# Patient Record
Sex: FEMALE | Race: WHITE | NOT HISPANIC OR LATINO | Employment: FULL TIME | URBAN - METROPOLITAN AREA
[De-identification: names, ages, dates, MRNs, and addresses within clinical notes are randomized per-mention and may not be internally consistent; named-entity substitution may affect disease eponyms.]

---

## 2017-03-30 RX ORDER — ACETAMINOPHEN AND CODEINE PHOSPHATE 120; 12 MG/5ML; MG/5ML
1 SOLUTION ORAL DAILY
Qty: 84 TAB | Refills: 3 | Status: SHIPPED | OUTPATIENT
Start: 2017-03-30 | End: 2018-02-24 | Stop reason: SDUPTHER

## 2017-03-30 NOTE — TELEPHONE ENCOUNTER
Was the patient seen in the last year in this department? Yes  8/2016    Does patient have an active prescription for medications requested? No     Received Request Via: Pharmacy

## 2017-06-12 RX ORDER — ACYCLOVIR 400 MG/1
400 TABLET ORAL DAILY
Qty: 90 TAB | Refills: 3 | Status: SHIPPED | OUTPATIENT
Start: 2017-06-12 | End: 2017-07-17 | Stop reason: CLARIF

## 2017-07-17 ENCOUNTER — HOSPITAL ENCOUNTER (OUTPATIENT)
Facility: MEDICAL CENTER | Age: 32
End: 2017-07-17
Attending: NURSE PRACTITIONER
Payer: COMMERCIAL

## 2017-07-17 ENCOUNTER — OFFICE VISIT (OUTPATIENT)
Dept: MEDICAL GROUP | Facility: CLINIC | Age: 32
End: 2017-07-17
Payer: COMMERCIAL

## 2017-07-17 VITALS
SYSTOLIC BLOOD PRESSURE: 100 MMHG | OXYGEN SATURATION: 99 % | TEMPERATURE: 98.8 F | HEART RATE: 56 BPM | HEIGHT: 68 IN | DIASTOLIC BLOOD PRESSURE: 66 MMHG | RESPIRATION RATE: 14 BRPM | BODY MASS INDEX: 22.88 KG/M2 | WEIGHT: 151 LBS

## 2017-07-17 DIAGNOSIS — N94.3 PMS (PREMENSTRUAL SYNDROME): ICD-10-CM

## 2017-07-17 DIAGNOSIS — Z12.4 CERVICAL CANCER SCREENING: ICD-10-CM

## 2017-07-17 DIAGNOSIS — Z13.220 SCREENING, LIPID: ICD-10-CM

## 2017-07-17 DIAGNOSIS — Z11.51 SCREENING FOR HPV (HUMAN PAPILLOMAVIRUS): ICD-10-CM

## 2017-07-17 DIAGNOSIS — Z01.419 WELL WOMAN EXAM WITH ROUTINE GYNECOLOGICAL EXAM: ICD-10-CM

## 2017-07-17 DIAGNOSIS — Z13.29 SCREENING FOR THYROID DISORDER: ICD-10-CM

## 2017-07-17 PROCEDURE — 87624 HPV HI-RISK TYP POOLED RSLT: CPT

## 2017-07-17 PROCEDURE — 99214 OFFICE O/P EST MOD 30 MIN: CPT | Mod: 25 | Performed by: NURSE PRACTITIONER

## 2017-07-17 PROCEDURE — 88175 CYTOPATH C/V AUTO FLUID REDO: CPT

## 2017-07-17 PROCEDURE — 99395 PREV VISIT EST AGE 18-39: CPT | Performed by: NURSE PRACTITIONER

## 2017-07-17 RX ORDER — VALACYCLOVIR HYDROCHLORIDE 1 G/1
1000 TABLET, FILM COATED ORAL DAILY
Qty: 90 TAB | Refills: 3 | Status: SHIPPED | OUTPATIENT
Start: 2017-07-17

## 2017-07-17 RX ORDER — PAROXETINE 10 MG/1
10 TABLET, FILM COATED ORAL DAILY
Qty: 90 TAB | Refills: 3 | Status: SHIPPED | OUTPATIENT
Start: 2017-07-17 | End: 2018-07-21 | Stop reason: SDUPTHER

## 2017-07-17 ASSESSMENT — PATIENT HEALTH QUESTIONNAIRE - PHQ9: CLINICAL INTERPRETATION OF PHQ2 SCORE: 0

## 2017-07-17 NOTE — MR AVS SNAPSHOT
"        Bess Tariq   2017 4:20 PM   Office Visit   MRN: [id number]    Department:  [facility]   Dept Phone:  [phone]    Description:  Female : 1985   Provider:  DANICA Blackwell           Reason for Visit     Annual Exam PE and PAP / labs request; inability to focus on one thing, has gotten worse since having childern      Allergies as of 2017     Allergen Noted Reactions    Sulfa Drugs 2016   Rash    Rash        You were diagnosed with     Well woman exam with routine gynecological exam   [469817]       Screening for HPV (human papillomavirus)   [976673]       Cervical cancer screening   [010991]       PMS (premenstrual syndrome)   [075223]         Vital Signs     Blood Pressure Pulse Temperature Respirations Height Weight    100/66 mmHg 56 37.1 °C (98.8 °F) 14 1.727 m (5' 8\") 68.493 kg (151 lb)    Body Mass Index Oxygen Saturation Last Menstrual Period Breastfeeding? Smoking Status       22.96 kg/m2 99% 2017 No Former Smoker       Basic Information     Date Of Birth Sex Race Ethnicity Preferred Language    1985 Female White Non- English      Problem List              ICD-10-CM Priority Class Noted - Resolved    Encounter for surveillance of contraceptive pills Z30.41   2016 - Present    PMS (premenstrual syndrome) N94.3   2016 - Present    Recurrent HSV (herpes simplex virus) B00.9   2016 - Present      Health Maintenance        Date Due Completion Dates    IMM INFLUENZA (1) 2017 10/20/2014, 10/9/2013, 10/17/2012, 10/21/2011, 10/21/2010    PAP SMEAR 2018 (Done)    Override on 2015: Done (NL; GYN=[place])    IMM DTaP/Tdap/Td Vaccine (3 - Td) 2021, 3/18/1997            Current Immunizations     Influenza TIV (IM) 10/20/2014    Influenza Vaccine Quad Inj (Pf) 10/9/2013, 10/17/2012    Influenza Vaccine Quad Inj (Preserved) 10/21/2011, 10/21/2010    MMR Vaccine 3/18/1997    TD Vaccine 3/18/1997    Tdap Vaccine " 6/18/2011      Below and/or attached are the medications your provider expects you to take. Review all of your home medications and newly ordered medications with your provider and/or pharmacist. Follow medication instructions as directed by your provider and/or pharmacist. Please keep your medication list with you and share with your provider. Update the information when medications are discontinued, doses are changed, or new medications (including over-the-counter products) are added; and carry medication information at all times in the event of emergency situations     Allergies:  SULFA DRUGS - Rash               Medications  Valid as of: July 17, 2017 -  5:30 PM    Generic Name Brand Name Tablet Size Instructions for use    Acyclovir (Tab) ZOVIRAX 400 MG Take 1 Tab by mouth every day. Indications: Genital Herpes        Clobetasol Propionate (Foam) OLUX 0.05 % Apply to scalp        Clobetasol Propionate (Cream) TEMOVATE 0.05 % Apply 1 Application to affected area(s) 2 times a day as needed.        Multiple Vitamins-Minerals   Take  by mouth.        Norethindrone (Tab) MICRONOR 0.35 MG Take 1 Tab by mouth every day.        PARoxetine HCl (Tab) PAXIL 10 MG Take 1 Tab by mouth every day.        Polyethylene Glycol 3350 (Powder) MIRALAX  Take 17 g by mouth every day.        .                 Medicines prescribed today were sent to:     [facility] [address] [place] [id number]    Phone: [phone] Fax: [phone]    Open 24 Hours?: No      Medication refill instructions:       If your prescription bottle indicates you have medication refills left, it is not necessary to call your provider’s office. Please contact your pharmacy and they will refill your medication.    If your prescription bottle indicates you do not have any refills left, you may request refills at any time through one of the following ways: The online [employer] system (except Urgent Care),  by calling your provider’s office, or by asking your pharmacy to contact your provider’s office with a refill request. Medication refills are processed only during regular business hours and may not be available until the next business day. Your provider may request additional information or to have a follow-up visit with you prior to refilling your medication.   *Please Note: Medication refills are assigned a new Rx number when refilled electronically. Your pharmacy may indicate that no refills were authorized even though a new prescription for the same medication is available at the pharmacy. Please request the medicine by name with the pharmacy before contacting your provider for a refill.        Your To Do List     Future Labs/Procedures Complete By Expires    THINPREP PAP WITH HPV  As directed 7/18/2018         [employer] Access Code: Activation code not generated  Current [employer] Status: Active

## 2017-07-18 LAB
CYTOLOGY REG CYTOL: NORMAL
HPV HR 12 DNA CVX QL NAA+PROBE: NEGATIVE
HPV16 DNA SPEC QL NAA+PROBE: NEGATIVE
HPV18 DNA SPEC QL NAA+PROBE: NEGATIVE
SPECIMEN SOURCE: NORMAL

## 2017-07-18 NOTE — PROGRESS NOTES
"CC: Annual Exam        HPI:   Bess presents today for the followin. Well woman exam with routine gynecological exam/Screening for HPV (human papillomavirus)/Cervical cancer screening  Requesting Pap. Last Pap 3 years ago. Normal to her knowledge. She does she ever had HPV testing. Monogamous.  has vasectomy    4. PMS (premenstrual syndrome)  Active associated increased irritability and anxiety prior to her menstruation. Previously was on Prozac without any improvement-took for about 6 months. Still has this    5. Screening for thyroid disorder/Screening, lipid  Requesting    Current Outpatient Prescriptions   Medication Sig Dispense Refill   • paroxetine (PAXIL) 10 MG Tab Take 1 Tab by mouth every day. 90 Tab 3   • valacyclovir (VALTREX) 1 GM Tab Take 1 Tab by mouth every day. 90 Tab 3   • norethindrone (MICRONOR) 0.35 MG tablet Take 1 Tab by mouth every day. 84 Tab 3   • clobetasol (OLUX) 0.05 % foam Apply to scalp 1 Can 0   • clobetasol (TEMOVATE) 0.05 % Cream Apply 1 Application to affected area(s) 2 times a day as needed. 1 Tube 2   • polyethylene glycol 3350 (MIRALAX) Powder Take 17 g by mouth every day.     • Multiple Vitamins-Minerals (MULTIVITAL PO) Take  by mouth.       No current facility-administered medications for this visit.     Social History   Substance Use Topics   • Smoking status: Former Smoker   • Smokeless tobacco: Never Used   • Alcohol Use: 2.4 - 3.0 oz/week     4-5 Glasses of wine per week     I reviewed patients allergies, problem list and medications today in EPIC.    ROS: Any/all pertinent positives listed in the HPI, otherwise all others reviewed are negative today.        /66 mmHg  Pulse 56  Temp(Src) 37.1 °C (98.8 °F)  Resp 14  Ht 1.727 m (5' 8\")  Wt 68.493 kg (151 lb)  BMI 22.96 kg/m2  SpO2 99%  LMP 2017  Breastfeeding? No  Physical Exam:  Gen:         Alert and oriented, No apparent distress. WDWN  Psych:     A+Ox3, normal affect and mood  Skin:     " Warm, dry and intact. Good turgor       No rashes in visible areas. Tattoos  Eye:         Conjunctiva clear, lids normal  ENMT:     Lips without lesions, good dentition       Oropharynx clear  Neck:       No Lymphadenopathy, Thyromegaly, Bruits.       Trachea midline, no masses  Lungs:     Clear to auscultation bilaterally, no rales or rhonchi       Unlabored respiratory effort  CV:          Regular rate and rhythm, S1, S2. No murmurs.       No Edema  Abd:         Soft non tender, non distended. Normal active bowel sounds.         No  Hepatosplenomegaly, No pulsatile masses.                   Ext:          No clubbing, cyanosis, edema. Pedal pulses 2+ bilaterally.  Breasts:  No dimpling/nipple changes/masses bilaterally.  Breast implants Reviewed SBEs  Vulva:       No lesions/erythema Vault: No erythema/odor/discharge  Cervix:     No lesions/ No CMT      Uterus: 7 cm; midline; nontender to bimanual; no adnexal masses or tenderness        Assessment and Plan.   32 y.o. female here for the followin. Well woman exam with routine gynecological exam/ Screening for HPV (human papillomavirus)/Cervical cancer screening  Completed today. If normal will repeat in 3-5 years  - THINPREP PAP WITH HPV; Future  - COMP METABOLIC PANEL; Future    2.\PMS (premenstrual syndrome)  Stable. Trial of Paxil. We did talk about self-care remedies  - paroxetine (PAXIL) 10 MG Tab; Take 1 Tab by mouth every day.  Dispense: 90 Tab; Refill: 3    5. Screening for thyroid disorder  Ordered  - TSH; Future    6. Screening, lipid  Ordered  - COMP METABOLIC PANEL; Future  - LIPID PANEL

## 2017-08-14 ENCOUNTER — HOSPITAL ENCOUNTER (OUTPATIENT)
Dept: LAB | Facility: MEDICAL CENTER | Age: 32
End: 2017-08-14
Attending: NURSE PRACTITIONER
Payer: COMMERCIAL

## 2017-08-14 ENCOUNTER — PATIENT MESSAGE (OUTPATIENT)
Dept: MEDICAL GROUP | Facility: CLINIC | Age: 32
End: 2017-08-14

## 2017-08-14 ENCOUNTER — APPOINTMENT (OUTPATIENT)
Dept: URGENT CARE | Facility: PHYSICIAN GROUP | Age: 32
End: 2017-08-14
Payer: COMMERCIAL

## 2017-08-14 DIAGNOSIS — Z01.419 WELL WOMAN EXAM WITH ROUTINE GYNECOLOGICAL EXAM: ICD-10-CM

## 2017-08-14 DIAGNOSIS — K59.01 SLOW TRANSIT CONSTIPATION: ICD-10-CM

## 2017-08-14 DIAGNOSIS — Z13.220 SCREENING, LIPID: ICD-10-CM

## 2017-08-14 DIAGNOSIS — Z13.29 SCREENING FOR THYROID DISORDER: ICD-10-CM

## 2017-08-14 DIAGNOSIS — R10.13 EPIGASTRIC PAIN: ICD-10-CM

## 2017-08-14 LAB
ALBUMIN SERPL BCP-MCNC: 4.2 G/DL (ref 3.2–4.9)
ALBUMIN/GLOB SERPL: 1.4 G/DL
ALP SERPL-CCNC: 57 U/L (ref 30–99)
ALT SERPL-CCNC: 14 U/L (ref 2–50)
ANION GAP SERPL CALC-SCNC: 7 MMOL/L (ref 0–11.9)
AST SERPL-CCNC: 18 U/L (ref 12–45)
BILIRUB SERPL-MCNC: 0.3 MG/DL (ref 0.1–1.5)
BUN SERPL-MCNC: 10 MG/DL (ref 8–22)
CALCIUM SERPL-MCNC: 9.3 MG/DL (ref 8.5–10.5)
CHLORIDE SERPL-SCNC: 101 MMOL/L (ref 96–112)
CHOLEST SERPL-MCNC: 140 MG/DL (ref 100–199)
CO2 SERPL-SCNC: 28 MMOL/L (ref 20–33)
CREAT SERPL-MCNC: 0.73 MG/DL (ref 0.5–1.4)
GFR SERPL CREATININE-BSD FRML MDRD: >60 ML/MIN/1.73 M 2
GLOBULIN SER CALC-MCNC: 3.1 G/DL (ref 1.9–3.5)
GLUCOSE SERPL-MCNC: 64 MG/DL (ref 65–99)
HDLC SERPL-MCNC: 52 MG/DL
LDLC SERPL CALC-MCNC: 75 MG/DL
POTASSIUM SERPL-SCNC: 4 MMOL/L (ref 3.6–5.5)
PROT SERPL-MCNC: 7.3 G/DL (ref 6–8.2)
SODIUM SERPL-SCNC: 136 MMOL/L (ref 135–145)
TRIGL SERPL-MCNC: 67 MG/DL (ref 0–149)
TSH SERPL DL<=0.005 MIU/L-ACNC: 1.25 UIU/ML (ref 0.3–3.7)

## 2017-08-14 PROCEDURE — 80053 COMPREHEN METABOLIC PANEL: CPT

## 2017-08-14 PROCEDURE — 80061 LIPID PANEL: CPT

## 2017-08-14 PROCEDURE — 36415 COLL VENOUS BLD VENIPUNCTURE: CPT

## 2017-08-14 PROCEDURE — 84443 ASSAY THYROID STIM HORMONE: CPT

## 2017-08-14 RX ORDER — OMEPRAZOLE 40 MG/1
40 CAPSULE, DELAYED RELEASE ORAL DAILY
Qty: 30 CAP | Refills: 11 | Status: SHIPPED | OUTPATIENT
Start: 2017-08-14 | End: 2018-08-15 | Stop reason: SDUPTHER

## 2017-08-15 DIAGNOSIS — K59.01 SLOW TRANSIT CONSTIPATION: ICD-10-CM

## 2017-08-15 RX ORDER — LACTULOSE 10 G/15ML
20 SOLUTION ORAL 2 TIMES DAILY PRN
Qty: 1 BOTTLE | Refills: 11 | Status: SHIPPED | OUTPATIENT
Start: 2017-08-15

## 2017-12-22 ENCOUNTER — HOSPITAL ENCOUNTER (OUTPATIENT)
Dept: RADIOLOGY | Facility: MEDICAL CENTER | Age: 32
End: 2017-12-22
Attending: INTERNAL MEDICINE
Payer: COMMERCIAL

## 2017-12-22 DIAGNOSIS — R14.0 ABDOMINAL DISTENTION: ICD-10-CM

## 2017-12-22 DIAGNOSIS — K59.00 COLONIC CONSTIPATION: ICD-10-CM

## 2017-12-22 DIAGNOSIS — R10.12 ABDOMINAL PAIN, LEFT UPPER QUADRANT: ICD-10-CM

## 2017-12-22 DIAGNOSIS — R11.0 NAUSEA: ICD-10-CM

## 2017-12-22 DIAGNOSIS — R19.7 DIARRHEA OF PRESUMED INFECTIOUS ORIGIN: ICD-10-CM

## 2017-12-22 PROCEDURE — 74000 DX-ABDOMEN-1 VIEW: CPT

## 2018-01-10 ENCOUNTER — APPOINTMENT (RX ONLY)
Dept: URBAN - METROPOLITAN AREA CLINIC 20 | Facility: CLINIC | Age: 33
Setting detail: DERMATOLOGY
End: 2018-01-10

## 2018-01-10 DIAGNOSIS — D22 MELANOCYTIC NEVI: ICD-10-CM

## 2018-01-10 PROBLEM — D22.39 MELANOCYTIC NEVI OF OTHER PARTS OF FACE: Status: ACTIVE | Noted: 2018-01-10

## 2018-01-10 PROCEDURE — 11900 INJECT SKIN LESIONS </W 7: CPT

## 2018-01-10 PROCEDURE — ? PRESCRIPTION MEDICATION MANAGEMENT

## 2018-01-10 PROCEDURE — ? INTRALESIONAL KENALOG

## 2018-01-10 ASSESSMENT — LOCATION ZONE DERM: LOCATION ZONE: FACE

## 2018-01-10 ASSESSMENT — LOCATION DETAILED DESCRIPTION DERM: LOCATION DETAILED: LEFT INFERIOR MEDIAL MALAR CHEEK

## 2018-01-10 ASSESSMENT — LOCATION SIMPLE DESCRIPTION DERM: LOCATION SIMPLE: LEFT CHEEK

## 2018-01-10 NOTE — PROCEDURE: INTRALESIONAL KENALOG
Detail Level: Detailed
Kenalog Preparation: Kenalog
Total Volume (Ccs): < 0.1
Include Z78.9 (Other Specified Conditions Influencing Health Status) As An Associated Diagnosis?: No
Consent: The risks of atrophy were reviewed with the patient.
X Size Of Lesion In Cm (Optional): 0
Concentration Of Kenalog Solution Injected (Mg/Ml): 2.5
Medical Necessity Clause: This procedure was medically necessary because the lesions that were treated were:

## 2018-02-08 ENCOUNTER — APPOINTMENT (RX ONLY)
Dept: URBAN - METROPOLITAN AREA CLINIC 20 | Facility: CLINIC | Age: 33
Setting detail: DERMATOLOGY
End: 2018-02-08

## 2018-02-08 DIAGNOSIS — D22 MELANOCYTIC NEVI: ICD-10-CM

## 2018-02-08 PROBLEM — D23.71 OTHER BENIGN NEOPLASM OF SKIN OF RIGHT LOWER LIMB, INCLUDING HIP: Status: ACTIVE | Noted: 2018-02-08

## 2018-02-08 PROBLEM — D48.5 NEOPLASM OF UNCERTAIN BEHAVIOR OF SKIN: Status: ACTIVE | Noted: 2018-02-08

## 2018-02-08 PROCEDURE — ? COUNSELING

## 2018-02-08 PROCEDURE — ? BIOPSY BY SHAVE METHOD

## 2018-02-08 PROCEDURE — 99212 OFFICE O/P EST SF 10 MIN: CPT | Mod: 25

## 2018-02-08 PROCEDURE — 11100: CPT

## 2018-02-08 ASSESSMENT — LOCATION SIMPLE DESCRIPTION DERM: LOCATION SIMPLE: LEFT CHEEK

## 2018-02-08 ASSESSMENT — LOCATION DETAILED DESCRIPTION DERM: LOCATION DETAILED: LEFT INFERIOR MEDIAL MALAR CHEEK

## 2018-02-08 ASSESSMENT — LOCATION ZONE DERM: LOCATION ZONE: FACE

## 2018-02-08 NOTE — PROCEDURE: BIOPSY BY SHAVE METHOD
Type Of Destruction Used: Curettage
Electrodesiccation Text: The wound bed was treated with electrodesiccation after the biopsy was performed.
Destruction After The Procedure: No
Billing Type: Third-Party Bill
Biopsy Method: Personna blade
Additional Anesthesia Volume In Cc (Will Not Render If 0): 0
Notification Instructions: Patient will be notified of biopsy results. However, patient instructed to call the office if not contacted within 2 weeks.
Silver Nitrate Text: The wound bed was treated with silver nitrate after the biopsy was performed.
Anesthesia Volume In Cc: 0.5
Biopsy Type: H and E
Anesthesia Type: 1% lidocaine with 1:100,000 epinephrine and 408mcg clindamycin/ml and a 1:10 solution of 8.4% sodium bicarbonate
Consent: Written consent was obtained and risks were reviewed including but not limited to scarring, infection, bleeding, scabbing, incomplete removal, nerve damage and allergy to anesthesia.
Lab Facility: 
Post-Care Instructions: I reviewed with the patient in detail post-care instructions. Patient is to keep the biopsy site dry overnight, and then apply bacitracin twice daily until healed. Patient may apply hydrogen peroxide soaks to remove any crusting.
Wound Care: Vaseline
Detail Level: Detailed
Hemostasis: Drysol and Electrocautery
Electrodesiccation And Curettage Text: The wound bed was treated with electrodesiccation and curettage after the biopsy was performed.
Lab: [id number]
Dressing: Band-Aid

## 2018-03-21 ENCOUNTER — APPOINTMENT (RX ONLY)
Dept: URBAN - METROPOLITAN AREA CLINIC 20 | Facility: CLINIC | Age: 33
Setting detail: DERMATOLOGY
End: 2018-03-21

## 2018-03-21 DIAGNOSIS — D22 MELANOCYTIC NEVI: ICD-10-CM

## 2018-03-21 PROBLEM — D22.39 MELANOCYTIC NEVI OF OTHER PARTS OF FACE: Status: ACTIVE | Noted: 2018-03-21

## 2018-03-21 PROCEDURE — 99212 OFFICE O/P EST SF 10 MIN: CPT

## 2018-03-21 PROCEDURE — ? ADDITIONAL NOTES

## 2018-03-21 ASSESSMENT — LOCATION DETAILED DESCRIPTION DERM: LOCATION DETAILED: LEFT INFERIOR CENTRAL MALAR CHEEK

## 2018-03-21 ASSESSMENT — LOCATION SIMPLE DESCRIPTION DERM: LOCATION SIMPLE: LEFT CHEEK

## 2018-03-21 ASSESSMENT — LOCATION ZONE DERM: LOCATION ZONE: FACE

## 2018-03-21 NOTE — PROCEDURE: ADDITIONAL NOTES
Additional Notes: Biopsy proven benign.  Still appears inflamed.  Still bothersome to pt.  Will discuss with MD and schedule pt for laser and/or excision.

## 2018-04-18 ENCOUNTER — APPOINTMENT (RX ONLY)
Dept: URBAN - METROPOLITAN AREA CLINIC 36 | Facility: CLINIC | Age: 33
Setting detail: DERMATOLOGY
End: 2018-04-18

## 2018-04-18 DIAGNOSIS — D22 MELANOCYTIC NEVI: ICD-10-CM

## 2018-04-18 PROBLEM — D48.5 NEOPLASM OF UNCERTAIN BEHAVIOR OF SKIN: Status: ACTIVE | Noted: 2018-04-18

## 2018-04-18 PROCEDURE — ? BIOPSY BY SHAVE METHOD

## 2018-04-18 PROCEDURE — 11100: CPT

## 2018-04-18 PROCEDURE — ? COUNSELING

## 2018-04-18 ASSESSMENT — LOCATION DETAILED DESCRIPTION DERM: LOCATION DETAILED: LEFT INFERIOR CENTRAL MALAR CHEEK

## 2018-04-18 ASSESSMENT — LOCATION ZONE DERM: LOCATION ZONE: FACE

## 2018-04-18 ASSESSMENT — LOCATION SIMPLE DESCRIPTION DERM: LOCATION SIMPLE: LEFT CHEEK

## 2018-04-18 NOTE — PROCEDURE: BIOPSY BY SHAVE METHOD
Destruction After The Procedure: No
Anesthesia Volume In Cc: 0.5
Anesthesia Type: 1% lidocaine with epinephrine
Post-Care Instructions: I reviewed with the patient in detail post-care instructions. Patient is to keep the biopsy site dry overnight, and then apply bacitracin twice daily until healed. Patient may apply hydrogen peroxide soaks to remove any crusting.
Render Post-Care Instructions In Note?: yes
Silver Nitrate Text: The wound bed was treated with silver nitrate after the biopsy was performed.
X Size Of Lesion In Cm: 0
Notification Instructions: Patient will be notified of biopsy results. However, patient instructed to call the office if not contacted within 2 weeks.
Detail Level: Detailed
Billing Type: Third-Party Bill
Lab Facility: 
Wound Care: Vaseline
Lab: [id number]
Dressing: bandage
Cryotherapy Text: The wound bed was treated with cryotherapy after the biopsy was performed.
Biopsy Type: H and E
Electrodesiccation Text: The wound bed was treated with electrodesiccation after the biopsy was performed.
Consent: Written consent was obtained and risks were reviewed including but not limited to scarring, infection, bleeding, scabbing, incomplete removal, nerve damage and allergy to anesthesia.
Electrodesiccation And Curettage Text: The wound bed was treated with electrodesiccation and curettage after the biopsy was performed.
Biopsy Method: Personna blade
Hemostasis: Drysol
Curettage Text: The wound bed was treated with curettage after the biopsy was performed.
Type Of Destruction Used: Curettage

## 2018-05-21 ENCOUNTER — APPOINTMENT (RX ONLY)
Dept: URBAN - METROPOLITAN AREA CLINIC 36 | Facility: CLINIC | Age: 33
Setting detail: DERMATOLOGY
End: 2018-05-21

## 2018-05-21 DIAGNOSIS — D22 MELANOCYTIC NEVI: ICD-10-CM

## 2018-05-21 PROBLEM — D22.9 MELANOCYTIC NEVI, UNSPECIFIED: Status: ACTIVE | Noted: 2018-05-21

## 2018-05-21 PROCEDURE — ? SCITON PROFRACTIONAL

## 2018-05-21 NOTE — PROCEDURE: SCITON PROFRACTIONAL
Ablation Depth (Optional): 250
Detail Level: Zone
Coagulation (Optional): 2
Location: left cheek
Post-Care Instructions: I reviewed with the patient in detail post-care instructions. Patient should stay away from the sun and wear sun protection until treated areas are fully healed. Provided Vaseline
Preprocedure Text: The treatment areas were thoroughly cleaned. The area was treated with no immediate stacking of pulses.
Post Procedure Text: The patient tolerated the procedure well. Ice-chilled washclothes were applied to the treatment area for comfort. Post care was reviewed with the patient.
Anesthesia Volume In Cc: 0
Density (Optional): 5.5
Price (Use Numbers Only, No Special Characters Or $): 50
Consent: Written consent obtained, risks reviewed including but not limited to crusting, scabbing, blistering, scarring, darker or lighter pigmentary change, bruising, and/or incomplete response.
Treatment Number: 1

## 2018-06-16 ENCOUNTER — APPOINTMENT (OUTPATIENT)
Dept: GENERAL RADIOLOGY | Facility: CLINIC | Age: 33
End: 2018-06-16
Attending: NURSE PRACTITIONER
Payer: COMMERCIAL

## 2018-06-16 ENCOUNTER — HOSPITAL ENCOUNTER (EMERGENCY)
Facility: CLINIC | Age: 33
Discharge: HOME | End: 2018-06-16
Attending: NURSE PRACTITIONER | Admitting: NURSE PRACTITIONER
Payer: COMMERCIAL

## 2018-06-16 VITALS
BODY MASS INDEX: 22.73 KG/M2 | DIASTOLIC BLOOD PRESSURE: 102 MMHG | WEIGHT: 150 LBS | HEIGHT: 68 IN | RESPIRATION RATE: 16 BRPM | HEART RATE: 78 BPM | TEMPERATURE: 98.3 F | SYSTOLIC BLOOD PRESSURE: 136 MMHG | OXYGEN SATURATION: 100 %

## 2018-06-16 DIAGNOSIS — S60.511A ABRASION OF RIGHT HAND, INITIAL ENCOUNTER: ICD-10-CM

## 2018-06-16 DIAGNOSIS — S69.91XA HAND INJURY, RIGHT, INITIAL ENCOUNTER: ICD-10-CM

## 2018-06-16 DIAGNOSIS — S60.221A CONTUSION OF RIGHT HAND, INITIAL ENCOUNTER: ICD-10-CM

## 2018-06-16 PROCEDURE — 73130 X-RAY EXAM OF HAND: CPT | Mod: RT

## 2018-06-16 PROCEDURE — 99283 EMERGENCY DEPT VISIT LOW MDM: CPT

## 2018-06-16 ASSESSMENT — ENCOUNTER SYMPTOMS
HEADACHES: 0
JOINT SWELLING: 1
ARTHRALGIAS: 1

## 2018-06-16 NOTE — ED AVS SNAPSHOT
[facility] Emergency Department    [address] [place] [address]    Phone:  [phone]    Fax:  [phone]                                       Addie Snyder   MRN: [id number]    Department:  [facility] Emergency Department   Date of Visit:  6/16/2018           Patient Information     Date Of Birth          1985        Your diagnoses for this visit were:     Hand injury, right, initial encounter     Contusion of right hand, initial encounter     Abrasion of right hand, initial encounter        You were seen by Roslyn Paige, ERNESTINA BELL.      Follow-up Information     Follow up with No Ref-Primary, Physician In 1 week.        Discharge Instructions         Abrasions  Abrasions are skin scrapes. Their treatment depends on how large and deep the abrasion is.  Home care  You may be prescribed an antibiotic cream or ointment to apply to the wound. This helps prevent infection. Follow instructions when using this medicine.  General care    To care for the abrasion, do the following each day for as long as directed by your healthcare provider.  ? If you were given a bandage, change it once a day. If your bandage sticks to the wound, soak it in warm water until it loosens.  ? Wash the area with soap and warm water. You may do this in a sink or under a tub faucet or shower. Rinse off the soap. Then pat the area dry with a clean towel.  ? If antibiotic ointment or cream was prescribed, reapply it to the wound as directed. Cover the wound with a fresh nonstick bandage. If the bandage becomes wet or dirty, change it as soon as possible.  ? Some antibiotic ointments or cream can cause an allergic reaction or dermatitis. This may cause redness, itching and or hives. If this occurs, stop using the ointment immediately and wash off any remaining ointment. You may need to take some allergy medicine to relieve symptoms.    You may use acetaminophen or ibuprofen to control  pain unless another pain medicine was prescribed. Talk with your healthcare provider before using these medicines if you have chronic liver or kidney disease or ever had a stomach ulcer or GI bleeding. Don t use ibuprofen in children younger than six months old.    Most skin wounds heal within 10 days. But an infection may occur even with treatment. So it s important to watch the wound for signs of infection as listed below.  Follow-up care  Follow up with your healthcare provider, or as advised.  When to seek medical advice  Call your healthcare provider right away if any of these occur:    Fever of 100.4 F (38 C) or higher, or as directed by your healthcare provider    Increasing pain, redness, swelling, or drainage from the wound    Bleeding from the wound that does not stop after a few minutes of steady, firm pressure    Decreased ability to move any body part near the wound  Date Last Reviewed: 3/3/2017    7419-6221 [employer]. [address] [place] [address]. All rights reserved. This information is not intended as a substitute for professional medical care. Always follow your healthcare professional's instructions.          Bruises (Contusions)    A contusion is a bruise. A bruise happens when a blow to your body doesn't break the skin but does break blood vessels beneath the skin. Blood leaking from the broken vessels causes redness and swelling. As it heals, your bruise is likely to turn colors like purple, green, and yellow. This is normal. The bruise should fade in 2 or 3 weeks.  Factors that make you more likely to bruise  Almost everyone bruises now and then. Certain people do bruise more easily than others. You're more prone to bruising as you get older. That's because blood vessels become more fragile with age. You're also more likely to bruise if you have a clotting disorder such as hemophilia or take medicines that reduce clotting, including aspirin and coumadin.  When to go  to the emergency room (ER)  Bruises almost always heal on their own without special treatment. But for some people, a bad bruise can be serious. Seek medical care if you:    Have a clotting disorder such as hemophilia    Have cirrhosis or other serious liver disease    Take blood-thinning medicines such as warfarin  What to expect in the ER  A doctor will examine your bruise and ask about any health conditions you have. In some cases, you may have a test to check how well your blood clots. Other treatment will depend on your needs.  Follow-up care  Sometimes a bruise gets worse instead of better. It may become larger and more swollen. This can occur when your body walls off a small pool of blood under the skin (hematoma). In very rare cases, your doctor may need to drain extra blood from the area.  Tip:  Apply an ice pack or bag of frozen peas to a bruise. Keep a thin cloth between the ice or frozen peas and your skin. The cold can help reduce redness and swelling.   Date Last Reviewed: 12/1/2016 2000-2017 [employer]. [address] [place] [address]. All rights reserved. This information is not intended as a substitute for professional medical care. Always follow your healthcare professional's instructions.          24 Hour Appointment Hotline       To make an appointment at any [facility], call [phone] ([phone]). If you don't have a family doctor or clinic, we will help you find one. [facility] clinics are conveniently located to serve the needs of you and your family.             Review of your medicines      Notice     You have not been prescribed any medications.            Procedures and tests performed during your visit     XR Hand Right G/E 3 Views      Orders Needing Specimen Collection     None      Pending Results     No orders found from 6/14/2018 to 6/17/2018.            Pending Culture Results     No orders found from 6/14/2018 to 6/17/2018.             Pending Results Instructions     If you had any lab results that were not finalized at the time of your Discharge, you can call the ED Lab Result RN at [phone]. You will be contacted by this team for any positive Lab results or changes in treatment. The nurses are available 7 days a week from 10A to 6:30P.  You can leave a message 24 hours per day and they will return your call.        Test Results From Your Hospital Stay        6/16/2018  7:41 PM      Narrative     HAND RIGHT THREE OR MORE VIEWS   6/16/2018 7:27 PM     HISTORY: Injury.     COMPARISON: None.        Impression     IMPRESSION: Three views right hand. No fracture or dislocation. No  significant soft tissue swelling. No radiopaque foreign body is  appreciated.    SHERIF WILKINS MD                Clinical Quality Measure: Blood Pressure Screening     Your blood pressure was checked while you were in the emergency department today. The last reading we obtained was  BP: (!) 136/102 . Please read the guidelines below about what these numbers mean and what you should do about them.  If your systolic blood pressure (the top number) is less than 120 and your diastolic blood pressure (the bottom number) is less than 80, then your blood pressure is normal. There is nothing more that you need to do about it.  If your systolic blood pressure (the top number) is 120-139 or your diastolic blood pressure (the bottom number) is 80-89, your blood pressure may be higher than it should be. You should have your blood pressure rechecked within a year by a primary care provider.  If your systolic blood pressure (the top number) is 140 or greater or your diastolic blood pressure (the bottom number) is 90 or greater, you may have high blood pressure. High blood pressure is treatable, but if left untreated over time it can put you at risk for heart attack, stroke, or kidney failure. You should have your blood pressure rechecked by a primary care provider within the next 4  "weeks.  If your provider in the emergency department today gave you specific instructions to follow-up with your doctor or provider even sooner than that, you should follow that instruction and not wait for up to 4 weeks for your follow-up visit.        Thank you for choosing [facility]       Thank you for choosing [facility] for your care. Our goal is always to provide you with excellent care. Hearing back from our patients is one way we can continue to improve our services. Please take a few minutes to complete the written survey that you may receive in the mail after you visit with us. Thank you!        [employer] Information     [employer] lets you send messages to your doctor, view your test results, renew your prescriptions, schedule appointments and more. To sign up, go to [email or web address] . Click on \"Log in\" on the left side of the screen, which will take you to the Welcome page. Then click on \"Sign up Now\" on the right side of the page.     You will be asked to enter the access code listed below, as well as some personal information. Please follow the directions to create your username and password.     Your access code is: [email or web address]  Expires: 2018  7:42 PM     Your access code will  in 90 days. If you need help or a new code, please call your [facility] clinic or [phone].        Care EveryWhere ID     This is your Care EveryWhere ID. This could be used by other organizations to access your [facility] medical records  [id number]        Equal Access to Services     LUCÍA STERN : Hadii chavez Diaz, waaxda luana, qaybta kaalmada tawana, nirmal dubose. So [facility] [phone].    ATENCIÓN: Si habla español, tiene a ruano disposición servicios gratuitos de asistencia lingüística. Llame al [phone].    We comply with applicable federal civil rights laws and [place] laws. We do not discriminate on the basis of race, color, national origin, age, " disability, sex, sexual orientation, or gender identity.            After Visit Summary       This is your record. Keep this with you and show to your community pharmacist(s) and doctor(s) at your next visit.
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
## 2018-06-16 NOTE — ED AVS SNAPSHOT
[facility] Emergency Department    [address]    [facility] [address]    Phone:  [phone]    Fax:  [phone]                                       Addie Snyder   MRN: [id number]    Department:  [facility] Emergency Department   Date of Visit:  6/16/2018           After Visit Summary Signature Page     I have received my discharge instructions, and my questions have been answered. I have discussed any challenges I see with this plan with the nurse or doctor.    ..........................................................................................................................................  Patient/Patient Representative Signature      ..........................................................................................................................................  Patient Representative Print Name and Relationship to Patient    ..................................................               ................................................  Date                                            Time    ..........................................................................................................................................  Reviewed by Signature/Title    ...................................................              ..............................................  Date                                                            Time

## 2018-06-17 NOTE — DISCHARGE INSTRUCTIONS
Abrasions  Abrasions are skin scrapes. Their treatment depends on how large and deep the abrasion is.  Home care  You may be prescribed an antibiotic cream or ointment to apply to the wound. This helps prevent infection. Follow instructions when using this medicine.  General care    To care for the abrasion, do the following each day for as long as directed by your healthcare provider.  ? If you were given a bandage, change it once a day. If your bandage sticks to the wound, soak it in warm water until it loosens.  ? Wash the area with soap and warm water. You may do this in a sink or under a tub faucet or shower. Rinse off the soap. Then pat the area dry with a clean towel.  ? If antibiotic ointment or cream was prescribed, reapply it to the wound as directed. Cover the wound with a fresh nonstick bandage. If the bandage becomes wet or dirty, change it as soon as possible.  ? Some antibiotic ointments or cream can cause an allergic reaction or dermatitis. This may cause redness, itching and or hives. If this occurs, stop using the ointment immediately and wash off any remaining ointment. You may need to take some allergy medicine to relieve symptoms.    You may use acetaminophen or ibuprofen to control pain unless another pain medicine was prescribed. Talk with your healthcare provider before using these medicines if you have chronic liver or kidney disease or ever had a stomach ulcer or GI bleeding. Don t use ibuprofen in children younger than six months old.    Most skin wounds heal within 10 days. But an infection may occur even with treatment. So it s important to watch the wound for signs of infection as listed below.  Follow-up care  Follow up with your healthcare provider, or as advised.  When to seek medical advice  Call your healthcare provider right away if any of these occur:    Fever of 100.4 F (38 C) or higher, or as directed by your healthcare provider    Increasing pain, redness, swelling, or  drainage from the wound    Bleeding from the wound that does not stop after a few minutes of steady, firm pressure    Decreased ability to move any body part near the wound  Date Last Reviewed: 3/3/2017    6307-1206 [employer]. [address] [place] [address], [place] [address]. All rights reserved. This information is not intended as a substitute for professional medical care. Always follow your healthcare professional's instructions.          Bruises (Contusions)    A contusion is a bruise. A bruise happens when a blow to your body doesn't break the skin but does break blood vessels beneath the skin. Blood leaking from the broken vessels causes redness and swelling. As it heals, your bruise is likely to turn colors like purple, green, and yellow. This is normal. The bruise should fade in 2 or 3 weeks.  Factors that make you more likely to bruise  Almost everyone bruises now and then. Certain people do bruise more easily than others. You're more prone to bruising as you get older. That's because blood vessels become more fragile with age. You're also more likely to bruise if you have a clotting disorder such as hemophilia or take medicines that reduce clotting, including aspirin and coumadin.  When to go to the emergency room (ER)  Bruises almost always heal on their own without special treatment. But for some people, a bad bruise can be serious. Seek medical care if you:    Have a clotting disorder such as hemophilia    Have cirrhosis or other serious liver disease    Take blood-thinning medicines such as warfarin  What to expect in the ER  A doctor will examine your bruise and ask about any health conditions you have. In some cases, you may have a test to check how well your blood clots. Other treatment will depend on your needs.  Follow-up care  Sometimes a bruise gets worse instead of better. It may become larger and more swollen. This can occur when your body walls off a small pool of blood under the  skin (hematoma). In very rare cases, your doctor may need to drain extra blood from the area.  Tip:  Apply an ice pack or bag of frozen peas to a bruise. Keep a thin cloth between the ice or frozen peas and your skin. The cold can help reduce redness and swelling.   Date Last Reviewed: 12/1/2016 2000-2017 [employer]. [address] [place] [address]. All rights reserved. This information is not intended as a substitute for professional medical care. Always follow your healthcare professional's instructions.
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
## 2018-06-22 ENCOUNTER — PATIENT MESSAGE (OUTPATIENT)
Dept: MEDICAL GROUP | Facility: CLINIC | Age: 33
End: 2018-06-22

## 2018-07-21 DIAGNOSIS — N94.3 PMS (PREMENSTRUAL SYNDROME): ICD-10-CM

## 2018-07-22 RX ORDER — PAROXETINE 10 MG/1
TABLET, FILM COATED ORAL
Qty: 90 TAB | Refills: 3 | Status: SHIPPED | OUTPATIENT
Start: 2018-07-22

## 2018-08-15 DIAGNOSIS — R10.13 EPIGASTRIC PAIN: ICD-10-CM

## 2018-08-15 RX ORDER — OMEPRAZOLE 40 MG/1
CAPSULE, DELAYED RELEASE ORAL
Qty: 90 CAP | Refills: 3 | Status: SHIPPED | OUTPATIENT
Start: 2018-08-15 | End: 2019-09-16 | Stop reason: SDUPTHER

## 2018-10-16 ENCOUNTER — OFFICE VISIT (OUTPATIENT)
Dept: URGENT CARE | Facility: PHYSICIAN GROUP | Age: 33
End: 2018-10-16
Payer: COMMERCIAL

## 2018-10-16 VITALS
DIASTOLIC BLOOD PRESSURE: 70 MMHG | WEIGHT: 161 LBS | BODY MASS INDEX: 24.4 KG/M2 | TEMPERATURE: 97.2 F | RESPIRATION RATE: 18 BRPM | HEART RATE: 68 BPM | OXYGEN SATURATION: 97 % | HEIGHT: 68 IN | SYSTOLIC BLOOD PRESSURE: 110 MMHG

## 2018-10-16 DIAGNOSIS — H60.392 OTHER INFECTIVE ACUTE OTITIS EXTERNA OF LEFT EAR: ICD-10-CM

## 2018-10-16 PROCEDURE — 99214 OFFICE O/P EST MOD 30 MIN: CPT | Performed by: INTERNAL MEDICINE

## 2018-10-16 RX ORDER — DOXYCYCLINE HYCLATE 100 MG
100 TABLET ORAL 2 TIMES DAILY
Qty: 14 TAB | Refills: 0 | Status: SHIPPED | OUTPATIENT
Start: 2018-10-16 | End: 2018-10-23

## 2018-10-17 ASSESSMENT — ENCOUNTER SYMPTOMS
PALPITATIONS: 0
DIARRHEA: 0
MYALGIAS: 0
SORE THROAT: 0
NAUSEA: 0
BLURRED VISION: 0
DIZZINESS: 0
CONSTIPATION: 0
VOMITING: 0
ABDOMINAL PAIN: 0
HEADACHES: 0
FEVER: 0
CHILLS: 0
COUGH: 0
EYE PAIN: 0
SHORTNESS OF BREATH: 0

## 2018-10-17 NOTE — PROGRESS NOTES
Subjective:      CC: Bess Tariq is a 33 y.o. female who presents with Otalgia (she had a zit on the nerve and the pain moved down to her jaw. she had some drainage for a week now. )      HPI:  This is a new problem. Bess Tariq is a 33 y.o. female who presents today for evaluation of left ear pain. She says that a few days ago she noticed a painful bump in her left ear. She thinks it was a pimple. She said she tried to get rid of it by rinsing her ear with hydrogen peroxide and then she put a Q-tip in her ear canal to try to pop the pimple. She said the pimple started around the time she [place] and got a lot of dirt/sand in her ears. She says that ever since she tried to pop the pimple she has noticed some fluid draining from her ear canal and has had increased pain in her left ear. She denies URI symptoms, hearing loss, fever/chills, or lightheadedness/dizziness.          Review of Systems   Constitutional: Negative for chills, fever and malaise/fatigue.   HENT: Positive for ear pain (Left ear). Negative for congestion and sore throat.    Eyes: Negative for blurred vision and pain.   Respiratory: Negative for cough and shortness of breath.    Cardiovascular: Negative for chest pain and palpitations.   Gastrointestinal: Negative for abdominal pain, constipation, diarrhea, nausea and vomiting.   Musculoskeletal: Negative for myalgias.   Neurological: Negative for dizziness and headaches.         PMH:  has a past medical history of Depression; Kidney disease; and Urinary tract infection, site not specified. She also has no past medical history of Anemia; Anxiety; Arrhythmia; Asthma; Blood transfusion without reported diagnosis; Cancer (CMS-HCC); Cataract; CHF (congestive heart failure) (CMS-HCC); Clotting disorder (CMS-HCC); COPD (chronic obstructive pulmonary disease) (CMS-HCC); Diabetes (CMS-HCC); Diabetic neuropathy (CMS-HCC); GERD (gastroesophageal reflux disease); Glaucoma;  Heart attack; Heart murmur; Hyperlipidemia; Hypertension; IBD (inflammatory bowel disease); Migraine; Muscle disorder; Osteoporosis; Parathyroid disorder (CMS-HCC); Pulmonary emphysema (CMS-HCC); Seizure (CMS-HCC); Stroke (CMS-[facility]); Thyroid disease; or Ulcer (CMS-[facility]).  MEDS:   Current Outpatient Prescriptions:   •  ciprofloxacin (CIPRO HC OTIC) 0.2-1 % Suspension, Place 3 Drops in ear 2 times a day., Disp: 1 Bottle, Rfl: 0  •  doxycycline (VIBRAMYCIN) 100 MG Tab, Take 1 Tab by mouth 2 times a day for 7 days., Disp: 14 Tab, Rfl: 0  •  omeprazole (PRILOSEC) 40 MG delayed-release capsule, TAKE ONE CAPSULE BY MOUTH ONCE DAILY, Disp: 90 Cap, Rfl: 3  •  PARoxetine (PAXIL) 10 MG Tab, TAKE ONE TABLET BY MOUTH ONCE DAILY, Disp: 90 Tab, Rfl: 3  •  acyclovir (ZOVIRAX) 400 MG tablet, TAKE ONE TABLET BY MOUTH ONCE DAILY, Disp: 90 Tab, Rfl: 3  •  valacyclovir (VALTREX) 1 GM Tab, Take 1 Tab by mouth every day., Disp: 90 Tab, Rfl: 3  •  polyethylene glycol 3350 (MIRALAX) Powder, Take 17 g by mouth every day., Disp: , Rfl:   •  Multiple Vitamins-Minerals (MULTIVITAL PO), Take  by mouth., Disp: , Rfl:   •  norethindrone (MICRONOR) 0.35 MG tablet, TAKE ONE TABLET BY MOUTH ONCE DAILY, Disp: 84 Tab, Rfl: 3  •  lactulose 10 GM/15ML Solution, Take 30 mL by mouth 2 times a day as needed (constipation)., Disp: 1 Bottle, Rfl: 11  •  clobetasol (OLUX) 0.05 % foam, Apply to scalp, Disp: 1 Can, Rfl: 0  •  clobetasol (TEMOVATE) 0.05 % Cream, Apply 1 Application to affected area(s) 2 times a day as needed., Disp: 1 Tube, Rfl: 2  ALLERGIES:   Allergies   Allergen Reactions   • Sulfa Drugs Rash     Rash       SURGHX:   Past Surgical History:   Procedure Laterality Date   • EYE SURGERY  10/15    lasik   • MAMMOPLASTY AUGMENTATION  5/10/2012    Performed by ALIDA SOFIA at [facility]   • LIPOSUCTION  5/10/2012    Performed by ALIDA SOFIA at [facility]     SOCX:  reports that she has quit smoking. She has  "never used smokeless tobacco. She reports that she drinks about 2.4 - 3.0 oz of alcohol per week . She reports that she does not use drugs.  FH: Family history was reviewed, no pertinent findings to report       Objective:     /70 (BP Location: Left arm, Patient Position: Sitting, BP Cuff Size: Adult)   Pulse 68   Temp 36.2 °C (97.2 °F) (Temporal)   Resp 18   Ht 1.727 m (5' 8\")   Wt 73 kg (161 lb)   SpO2 97%   BMI 24.48 kg/m²        Physical Exam   Constitutional: She is oriented to person, place, and time. Vital signs are normal. She appears well-developed and well-nourished.   HENT:   Head: Normocephalic and atraumatic.   Right Ear: Tympanic membrane, external ear and ear canal normal.   Left Ear: Tympanic membrane and external ear normal.   Nose: Nose normal.   Mouth/Throat: Uvula is midline, oropharynx is clear and moist and mucous membranes are normal.   Inferior wall of left ear canal exhibits a small laceration with surrounding abrasion and erythema. It is weeping yellow fluid. Tragus is TTP and there is increased pain with manipulation of the ear lobe   Eyes: Pupils are equal, round, and reactive to light. Conjunctivae are normal.   Neck: Normal range of motion.   Cardiovascular: Normal rate, regular rhythm and normal heart sounds.    No murmur heard.  Pulmonary/Chest: Effort normal and breath sounds normal. She has no wheezes.   Lymphadenopathy:        Head (left side): Preauricular and posterior auricular adenopathy present.     She has no cervical adenopathy.   Neurological: She is alert and oriented to person, place, and time.   Skin: Skin is warm and dry. Capillary refill takes less than 2 seconds.   Psychiatric: She has a normal mood and affect. Her behavior is normal. Judgment normal.          Assessment/Plan:     1. Other infective acute otitis externa of left ear  - ciprofloxacin (CIPRO HC OTIC) 0.2-1 % Suspension; Place 3 Drops in ear 2 times a day.  Dispense: 1 Bottle; Refill: 0  - " doxycycline (VIBRAMYCIN) 100 MG Tab; Take 1 Tab by mouth 2 times a day for 7 days.  Dispense: 14 Tab; Refill: 0      Differential Diagnosis, natural history, and supportive care discussed. Return to the Urgent Care or follow up with your PCP if symptoms fail to resolve, or for any new or worsening symptoms. Emergency room precautions discussed. Patient and/or family appears understanding of information.    Case and results reviewed and agree with treatment plan as outlined.  Dr. Fowler

## 2018-12-04 ENCOUNTER — PATIENT MESSAGE (OUTPATIENT)
Dept: MEDICAL GROUP | Facility: MEDICAL CENTER | Age: 33
End: 2018-12-04

## 2018-12-05 RX ORDER — METRONIDAZOLE 500 MG/1
500 TABLET ORAL 2 TIMES DAILY
Qty: 14 TAB | Refills: 0 | Status: SHIPPED | OUTPATIENT
Start: 2018-12-05 | End: 2018-12-12

## 2018-12-05 NOTE — TELEPHONE ENCOUNTER
From: Bess Tariq  To: DANICA Blackwell  Sent: 12/4/2018 10:12 PM PST  Subject: Non-Urgent Medical Question    Miguel A Pearce sorry to bother you. I have an appointment with Dr Magana in 2 weeks. Unfortunately I’ve had another period since we last talked and it’s disrupted my natural ph and as a consequence I’m 99% sure I have BV now. This body of mine is giving me grief. Can you call me in a prescription for the BV so I don’t have to wait 2 weeks to clear it up.   Thank you,  Bess Tariq

## 2018-12-21 ENCOUNTER — HOSPITAL ENCOUNTER (OUTPATIENT)
Dept: LAB | Facility: MEDICAL CENTER | Age: 33
End: 2018-12-21
Attending: OBSTETRICS & GYNECOLOGY
Payer: COMMERCIAL

## 2018-12-21 LAB
CHOLEST SERPL-MCNC: 165 MG/DL (ref 100–199)
ESTRADIOL SERPL-MCNC: 36 PG/ML
FSH SERPL-ACNC: 14.4 MIU/ML
HDLC SERPL-MCNC: 62 MG/DL
LDLC SERPL CALC-MCNC: 93 MG/DL
LH SERPL-ACNC: 34 IU/L
T4 SERPL-MCNC: 7.5 UG/DL (ref 4–12)
TRIGL SERPL-MCNC: 51 MG/DL (ref 0–149)
TSH SERPL DL<=0.005 MIU/L-ACNC: 1.34 UIU/ML (ref 0.38–5.33)

## 2018-12-21 PROCEDURE — 83002 ASSAY OF GONADOTROPIN (LH): CPT

## 2018-12-21 PROCEDURE — 83001 ASSAY OF GONADOTROPIN (FSH): CPT

## 2018-12-21 PROCEDURE — 84443 ASSAY THYROID STIM HORMONE: CPT

## 2018-12-21 PROCEDURE — 36415 COLL VENOUS BLD VENIPUNCTURE: CPT

## 2018-12-21 PROCEDURE — 84436 ASSAY OF TOTAL THYROXINE: CPT

## 2018-12-21 PROCEDURE — 82670 ASSAY OF TOTAL ESTRADIOL: CPT

## 2018-12-21 PROCEDURE — 80061 LIPID PANEL: CPT

## 2019-03-24 RX ORDER — ACYCLOVIR 400 MG/1
TABLET ORAL
Qty: 90 TAB | Refills: 3 | Status: SHIPPED | OUTPATIENT
Start: 2019-03-24 | End: 2020-03-27 | Stop reason: SDUPTHER

## 2019-09-16 DIAGNOSIS — R10.13 EPIGASTRIC PAIN: ICD-10-CM

## 2019-09-17 RX ORDER — OMEPRAZOLE 40 MG/1
CAPSULE, DELAYED RELEASE ORAL
Qty: 90 CAP | Refills: 3 | Status: SHIPPED | OUTPATIENT
Start: 2019-09-17

## 2020-03-27 RX ORDER — ACYCLOVIR 400 MG/1
TABLET ORAL
Qty: 90 TAB | Refills: 3 | Status: SHIPPED | OUTPATIENT
Start: 2020-03-27